# Patient Record
Sex: FEMALE | Race: WHITE | HISPANIC OR LATINO
[De-identification: names, ages, dates, MRNs, and addresses within clinical notes are randomized per-mention and may not be internally consistent; named-entity substitution may affect disease eponyms.]

---

## 2024-07-17 ENCOUNTER — LABORATORY RESULT (OUTPATIENT)
Age: 44
End: 2024-07-17

## 2024-07-17 ENCOUNTER — OUTPATIENT (OUTPATIENT)
Dept: OUTPATIENT SERVICES | Facility: HOSPITAL | Age: 44
LOS: 1 days | End: 2024-07-17
Payer: SELF-PAY

## 2024-07-17 ENCOUNTER — APPOINTMENT (OUTPATIENT)
Dept: OBGYN | Facility: CLINIC | Age: 44
End: 2024-07-17

## 2024-07-17 VITALS — DIASTOLIC BLOOD PRESSURE: 74 MMHG | WEIGHT: 134 LBS | SYSTOLIC BLOOD PRESSURE: 118 MMHG

## 2024-07-17 DIAGNOSIS — T83.32XS DISPLACEMENT OF INTRAUTERINE CONTRACEPTIVE DEVICE, SEQUELA: ICD-10-CM

## 2024-07-17 DIAGNOSIS — T83.39XA OTHER MECHANICAL COMPLICATION OF INTRAUTERINE CONTRACEPTIVE DEVICE, INITIAL ENCOUNTER: ICD-10-CM

## 2024-07-17 DIAGNOSIS — Z12.72 ENCOUNTER FOR GYNECOLOGICAL EXAMINATION (GENERAL) (ROUTINE) W/OUT ABNORMAL FINDINGS: ICD-10-CM

## 2024-07-17 DIAGNOSIS — R92.30 DENSE BREASTS, UNSPECIFIED: ICD-10-CM

## 2024-07-17 DIAGNOSIS — Z01.419 ENCOUNTER FOR GYNECOLOGICAL EXAMINATION (GENERAL) (ROUTINE) W/OUT ABNORMAL FINDINGS: ICD-10-CM

## 2024-07-17 DIAGNOSIS — N76.0 ACUTE VAGINITIS: ICD-10-CM

## 2024-07-17 DIAGNOSIS — Z11.3 ENCOUNTER FOR SCREENING FOR INFECTIONS WITH A PREDOMINANTLY SEXUAL MODE OF TRANSMISSION: ICD-10-CM

## 2024-07-17 LAB — HCG UR QL: NEGATIVE

## 2024-07-17 PROCEDURE — 36415 COLL VENOUS BLD VENIPUNCTURE: CPT

## 2024-07-17 PROCEDURE — 86803 HEPATITIS C AB TEST: CPT

## 2024-07-17 PROCEDURE — G0463: CPT

## 2024-07-17 PROCEDURE — 87491 CHLMYD TRACH DNA AMP PROBE: CPT

## 2024-07-17 PROCEDURE — 87340 HEPATITIS B SURFACE AG IA: CPT

## 2024-07-17 PROCEDURE — 87591 N.GONORRHOEAE DNA AMP PROB: CPT

## 2024-07-17 PROCEDURE — T1013: CPT

## 2024-07-17 PROCEDURE — 87389 HIV-1 AG W/HIV-1&-2 AB AG IA: CPT

## 2024-07-17 PROCEDURE — 81025 URINE PREGNANCY TEST: CPT

## 2024-07-17 PROCEDURE — G0444 DEPRESSION SCREEN ANNUAL: CPT | Mod: 59

## 2024-07-17 PROCEDURE — 99386 PREV VISIT NEW AGE 40-64: CPT

## 2024-07-17 PROCEDURE — 86780 TREPONEMA PALLIDUM: CPT

## 2024-07-17 PROCEDURE — 87624 HPV HI-RISK TYP POOLED RSLT: CPT

## 2024-07-18 DIAGNOSIS — Z01.419 ENCOUNTER FOR GYNECOLOGICAL EXAMINATION (GENERAL) (ROUTINE) WITHOUT ABNORMAL FINDINGS: ICD-10-CM

## 2024-07-18 DIAGNOSIS — Z11.3 ENCOUNTER FOR SCREENING FOR INFECTIONS WITH A PREDOMINANTLY SEXUAL MODE OF TRANSMISSION: ICD-10-CM

## 2024-07-18 DIAGNOSIS — R92.30 DENSE BREASTS, UNSPECIFIED: ICD-10-CM

## 2024-07-18 DIAGNOSIS — T83.39XA OTHER MECHANICAL COMPLICATION OF INTRAUTERINE CONTRACEPTIVE DEVICE, INITIAL ENCOUNTER: ICD-10-CM

## 2024-07-18 LAB
C TRACH RRNA SPEC QL NAA+PROBE: SIGNIFICANT CHANGE UP
HBV SURFACE AG SER-ACNC: SIGNIFICANT CHANGE UP
HCV AB S/CO SERPL IA: 0.16 S/CO — SIGNIFICANT CHANGE UP (ref 0–0.99)
HCV AB SERPL-IMP: SIGNIFICANT CHANGE UP
HIV 1+2 AB+HIV1 P24 AG SERPL QL IA: SIGNIFICANT CHANGE UP
HPV HIGH+LOW RISK DNA PNL CVX: SIGNIFICANT CHANGE UP
N GONORRHOEA RRNA SPEC QL NAA+PROBE: SIGNIFICANT CHANGE UP
SPECIMEN SOURCE: SIGNIFICANT CHANGE UP
T PALLIDUM AB TITR SER: NEGATIVE — SIGNIFICANT CHANGE UP

## 2024-07-21 LAB — CYTOLOGY SPEC DOC CYTO: SIGNIFICANT CHANGE UP

## 2024-08-21 ENCOUNTER — APPOINTMENT (OUTPATIENT)
Dept: INTERNAL MEDICINE | Facility: CLINIC | Age: 44
End: 2024-08-21
Payer: COMMERCIAL

## 2024-08-21 ENCOUNTER — OUTPATIENT (OUTPATIENT)
Dept: OUTPATIENT SERVICES | Facility: HOSPITAL | Age: 44
LOS: 1 days | End: 2024-08-21
Payer: SELF-PAY

## 2024-08-21 VITALS
OXYGEN SATURATION: 99 % | BODY MASS INDEX: 27.48 KG/M2 | WEIGHT: 140 LBS | DIASTOLIC BLOOD PRESSURE: 66 MMHG | HEART RATE: 73 BPM | HEIGHT: 60 IN | SYSTOLIC BLOOD PRESSURE: 108 MMHG

## 2024-08-21 DIAGNOSIS — Z00.00 ENCOUNTER FOR GENERAL ADULT MEDICAL EXAMINATION W/OUT ABNORMAL FINDINGS: ICD-10-CM

## 2024-08-21 DIAGNOSIS — T83.39XA OTHER MECHANICAL COMPLICATION OF INTRAUTERINE CONTRACEPTIVE DEVICE, INITIAL ENCOUNTER: ICD-10-CM

## 2024-08-21 DIAGNOSIS — Z23 ENCOUNTER FOR IMMUNIZATION: ICD-10-CM

## 2024-08-21 DIAGNOSIS — Z80.49 FAMILY HISTORY OF MALIGNANT NEOPLASM OF OTHER GENITAL ORGANS: ICD-10-CM

## 2024-08-21 DIAGNOSIS — R92.30 DENSE BREASTS, UNSPECIFIED: ICD-10-CM

## 2024-08-21 DIAGNOSIS — I10 ESSENTIAL (PRIMARY) HYPERTENSION: ICD-10-CM

## 2024-08-21 DIAGNOSIS — N92.6 IRREGULAR MENSTRUATION, UNSPECIFIED: ICD-10-CM

## 2024-08-21 DIAGNOSIS — Z11.3 ENCOUNTER FOR SCREENING FOR INFECTIONS WITH A PREDOMINANTLY SEXUAL MODE OF TRANSMISSION: ICD-10-CM

## 2024-08-21 DIAGNOSIS — Z30.09 ENCOUNTER FOR OTHER GENERAL COUNSELING AND ADVICE ON CONTRACEPTION: ICD-10-CM

## 2024-08-21 DIAGNOSIS — H11.002 UNSPECIFIED PTERYGIUM OF LEFT EYE: ICD-10-CM

## 2024-08-21 PROCEDURE — 80053 COMPREHEN METABOLIC PANEL: CPT

## 2024-08-21 PROCEDURE — 90715 TDAP VACCINE 7 YRS/> IM: CPT

## 2024-08-21 PROCEDURE — 90471 IMMUNIZATION ADMIN: CPT

## 2024-08-21 PROCEDURE — 80061 LIPID PANEL: CPT

## 2024-08-21 PROCEDURE — 99386 PREV VISIT NEW AGE 40-64: CPT | Mod: 25,GC

## 2024-08-21 PROCEDURE — G0463: CPT | Mod: 25

## 2024-08-21 PROCEDURE — 84443 ASSAY THYROID STIM HORMONE: CPT

## 2024-08-21 PROCEDURE — 85027 COMPLETE CBC AUTOMATED: CPT

## 2024-08-21 PROCEDURE — 83036 HEMOGLOBIN GLYCOSYLATED A1C: CPT

## 2024-08-21 NOTE — HISTORY OF PRESENT ILLNESS
[FreeTextEntry1] : CPE [de-identified] : 43 y/o F w/ no significant past medical history here to establish care. She complains of irregular periods (LMP 2 months ago) for the past 7 months. Prior to that, patient had consistent periods every month that lasted 5 days, with normal flow. She is not currently sexually active, and has no contraceptive methods. She also complains of expanding pterygium, which she had for over 10 years, and blurry vision.  PMH: None Medications: None Surgical History: C section 17 years ago; tummy tuck 5 weeks ago Allergies: None SH: Works as a ; lives with her son. Walks> 30 mins daily at work. Diet consists of chicken, rice, meats, fruits, and vegetables  -Smoking: never -Alcohol: weekends (4 beers) -Drugs: None -Sex: Not currently sexually active; No contraception Family History: uterine cancer in mom; endometrial cancer in paternal aunt Menstrual history: irregular periods for the past 7 months ago prior (LMP: 2 months ago)

## 2024-08-21 NOTE — PLAN
[FreeTextEntry1] : #Pterygium - expanding L sided pterygium + blurry vision - Optho referral   # Irregular menses -Irregular menses for the past 7 months - f/u TSH  #HCM: -Pt received STI testing 7/17/24: negative -PAP smear 7/24- negative -f/u mammogram received referral and phone number -contraception: provided education on all methods; patient declined -f/u CBC, CMP, lipid profile, HBA1C, TSH - Tdap today

## 2024-08-21 NOTE — HEALTH RISK ASSESSMENT
[Good] : ~his/her~  mood as  good [Yes] : Yes [2 - 4 times a month (2 pts)] : 2-4 times a month (2 points) [Never (0 pts)] : Never (0 points) [No] : In the past 12 months have you used drugs other than those required for medical reasons? No [No falls in past year] : Patient reported no falls in the past year [Never] : Never [With Family] : lives with family [Employed] : employed [Single] : single [Feels Safe at Home] : Feels safe at home [Fully functional (bathing, dressing, toileting, transferring, walking, feeding)] : Fully functional (bathing, dressing, toileting, transferring, walking, feeding) [Fully functional (using the telephone, shopping, preparing meals, housekeeping, doing laundry, using] : Fully functional and needs no help or supervision to perform IADLs (using the telephone, shopping, preparing meals, housekeeping, doing laundry, using transportation, managing medications and managing finances) [Reports changes in vision] : Reports changes in vision [Reports normal functional visual acuity (ie: able to read med bottle)] : Reports normal functional visual acuity [FreeTextEntry1] : blurry vision, missed periods [PHQ-2 Negative - No further assessment needed] : PHQ-2 Negative - No further assessment needed [de-identified] : Daily 30 min walks during work [de-identified] :  Diet consists of chicken, rice, meats, fruits, and vegetables  [[id number]] : 0 [Sexually Active] : not sexually active [High Risk Behavior] : no high risk behavior [Reports changes in hearing] : Reports no changes in hearing [FreeTextEntry2] : sandra

## 2024-08-21 NOTE — PHYSICAL EXAM
[No Acute Distress] : no acute distress [Well Nourished] : well nourished [Well-Appearing] : well-appearing [Normal Sclera/Conjunctiva] : normal sclera/conjunctiva [PERRL] : pupils equal round and reactive to light [EOMI] : extraocular movements intact [Normal Outer Ear/Nose] : the outer ears and nose were normal in appearance [Normal Oropharynx] : the oropharynx was normal [No JVD] : no jugular venous distention [No Lymphadenopathy] : no lymphadenopathy [Supple] : supple [Thyroid Normal, No Nodules] : the thyroid was normal and there were no nodules present [No Respiratory Distress] : no respiratory distress  [No Accessory Muscle Use] : no accessory muscle use [Clear to Auscultation] : lungs were clear to auscultation bilaterally [Normal Rate] : normal rate  [Regular Rhythm] : with a regular rhythm [Normal S1, S2] : normal S1 and S2 [No Murmur] : no murmur heard [No Edema] : there was no peripheral edema [Normal Appearance] : normal in appearance [No Nipple Discharge] : no nipple discharge [Soft] : abdomen soft [Non Tender] : non-tender [Non-distended] : non-distended [No Masses] : no abdominal mass palpated [No HSM] : no HSM [Normal Bowel Sounds] : normal bowel sounds [Normal Posterior Cervical Nodes] : no posterior cervical lymphadenopathy [Normal Anterior Cervical Nodes] : no anterior cervical lymphadenopathy [No CVA Tenderness] : no CVA  tenderness [de-identified] : + Left sided pterygium

## 2024-08-21 NOTE — PAST MEDICAL HISTORY
[Approximately ___] : the LMP was approximately [unfilled] [Irregular Cycle Intervals] : are  irregular [Live Births ___] : P[unfilled]

## 2024-08-21 NOTE — ASSESSMENT
[FreeTextEntry1] : 45 y/o F w/ no significant past medical history here to establish care. She complains of irregular periods (LMP 2 months ago) for the past 7 months and of expanding pterygium, and blurred vision

## 2024-08-21 NOTE — REVIEW OF SYSTEMS
[Vision Problems] : vision problems [Fever] : no fever [Chills] : no chills [Fatigue] : no fatigue [Hot Flashes] : no hot flashes [Night Sweats] : no night sweats [Recent Change In Weight] : ~T no recent weight change [Discharge] : no discharge [Pain] : no pain [Redness] : no redness [Dryness] : no dryness  [Itching] : no itching [Earache] : no earache [Hearing Loss] : no hearing loss [Nosebleed] : no nosebleeds [Hoarseness] : no hoarseness [Nasal Discharge] : no nasal discharge [Sore Throat] : no sore throat [Postnasal Drip] : no postnasal drip [Chest Pain] : no chest pain [Palpitations] : no palpitations [Lower Ext Edema] : no lower extremity edema [Orthopnea] : no orthopnea [Paroxysmal Nocturnal Dyspnea] : no paroxysmal nocturnal dyspnea [Shortness Of Breath] : no shortness of breath [Wheezing] : no wheezing [Cough] : no cough [Dyspnea on Exertion] : no dyspnea on exertion [Abdominal Pain] : no abdominal pain [Nausea] : no nausea [Constipation] : no constipation [Diarrhea] : diarrhea [Vomiting] : no vomiting [Heartburn] : no heartburn [Dysuria] : no dysuria [Incontinence] : no incontinence [Nocturia] : no nocturia [Poor Libido] : libido not poor [Hematuria] : no hematuria [Frequency] : no frequency [Vaginal Discharge] : no vaginal discharge [Joint Pain] : no joint pain [Joint Stiffness] : no joint stiffness [Joint Swelling] : no joint swelling [Muscle Weakness] : no muscle weakness [Muscle Pain] : no muscle pain [Back Pain] : no back pain [Skin Rash] : no skin rash [Headache] : no headache [Dizziness] : no dizziness [Fainting] : no fainting [FreeTextEntry3] : +blurred vision

## 2024-08-23 LAB
ALBUMIN SERPL ELPH-MCNC: 4.8 G/DL
ALP BLD-CCNC: 79 U/L
ALT SERPL-CCNC: 22 U/L
ANION GAP SERPL CALC-SCNC: 17 MMOL/L
AST SERPL-CCNC: 22 U/L
BILIRUB SERPL-MCNC: 0.3 MG/DL
BUN SERPL-MCNC: 16 MG/DL
CALCIUM SERPL-MCNC: 9.7 MG/DL
CHLORIDE SERPL-SCNC: 103 MMOL/L
CHOLEST SERPL-MCNC: 197 MG/DL
CO2 SERPL-SCNC: 20 MMOL/L
CREAT SERPL-MCNC: 0.68 MG/DL
EGFR: 111 ML/MIN/1.73M2
ESTIMATED AVERAGE GLUCOSE: 100 MG/DL
GLUCOSE SERPL-MCNC: 70 MG/DL
HBA1C MFR BLD HPLC: 5.1 %
HCT VFR BLD CALC: 40.7 %
HDLC SERPL-MCNC: 91 MG/DL
HGB BLD-MCNC: 13.1 G/DL
LDLC SERPL CALC-MCNC: 77 MG/DL
MCHC RBC-ENTMCNC: 30.3 PG
MCHC RBC-ENTMCNC: 32.2 GM/DL
MCV RBC AUTO: 94 FL
NONHDLC SERPL-MCNC: 105 MG/DL
PLATELET # BLD AUTO: 259 K/UL
POTASSIUM SERPL-SCNC: 4 MMOL/L
PROT SERPL-MCNC: 7.6 G/DL
RBC # BLD: 4.33 M/UL
RBC # FLD: 13.2 %
SODIUM SERPL-SCNC: 140 MMOL/L
TRIGL SERPL-MCNC: 176 MG/DL
TSH SERPL-ACNC: 0.93 UIU/ML
WBC # FLD AUTO: 7.03 K/UL

## 2024-08-26 DIAGNOSIS — Z23 ENCOUNTER FOR IMMUNIZATION: ICD-10-CM

## 2024-08-26 DIAGNOSIS — H11.002 UNSPECIFIED PTERYGIUM OF LEFT EYE: ICD-10-CM

## 2024-08-26 DIAGNOSIS — N92.6 IRREGULAR MENSTRUATION, UNSPECIFIED: ICD-10-CM

## 2024-08-26 DIAGNOSIS — Z00.00 ENCOUNTER FOR GENERAL ADULT MEDICAL EXAMINATION WITHOUT ABNORMAL FINDINGS: ICD-10-CM

## 2024-08-26 DIAGNOSIS — Z30.09 ENCOUNTER FOR OTHER GENERAL COUNSELING AND ADVICE ON CONTRACEPTION: ICD-10-CM

## 2024-08-26 DIAGNOSIS — Z80.49 FAMILY HISTORY OF MALIGNANT NEOPLASM OF OTHER GENITAL ORGANS: ICD-10-CM
